# Patient Record
Sex: FEMALE | Race: OTHER | Employment: FULL TIME | URBAN - METROPOLITAN AREA
[De-identification: names, ages, dates, MRNs, and addresses within clinical notes are randomized per-mention and may not be internally consistent; named-entity substitution may affect disease eponyms.]

---

## 2022-11-05 ENCOUNTER — TELEPHONE (OUTPATIENT)
Dept: CASE MANAGEMENT | Facility: HOSPITAL | Age: 22
End: 2022-11-05

## 2022-11-05 PROBLEM — E87.6 HYPOKALEMIA: Status: ACTIVE | Noted: 2022-11-05

## 2022-11-05 NOTE — PROGRESS NOTES
Caller on phone, Alexandra Chung, says he is patient's . He states patient called him several times to pick her up because she didn't have her phone. Alexandra Chung states patient is not at home. He is asking if [facility] arranged transport or the patient. Alexandra Chung is not listed as a contact. The only information given to Alexandra Chung was that patient was picked up.